# Patient Record
Sex: MALE | Employment: OTHER | URBAN - METROPOLITAN AREA
[De-identification: names, ages, dates, MRNs, and addresses within clinical notes are randomized per-mention and may not be internally consistent; named-entity substitution may affect disease eponyms.]

---

## 2021-03-15 ENCOUNTER — APPOINTMENT (OUTPATIENT)
Dept: CARDIOLOGY | Facility: CLINIC | Age: 66
End: 2021-03-15
Attending: STUDENT IN AN ORGANIZED HEALTH CARE EDUCATION/TRAINING PROGRAM
Payer: COMMERCIAL

## 2021-03-15 ENCOUNTER — COMMUNICATION - HEALTHEAST (OUTPATIENT)
Dept: SCHEDULING | Facility: CLINIC | Age: 66
End: 2021-03-15

## 2021-03-15 ENCOUNTER — HOSPITAL ENCOUNTER (OUTPATIENT)
Facility: CLINIC | Age: 66
Setting detail: OBSERVATION
Discharge: HOME | End: 2021-03-15
Attending: INTERNAL MEDICINE | Admitting: STUDENT IN AN ORGANIZED HEALTH CARE EDUCATION/TRAINING PROGRAM
Payer: COMMERCIAL

## 2021-03-15 ENCOUNTER — TRANSFERRED RECORDS (OUTPATIENT)
Dept: HEALTH INFORMATION MANAGEMENT | Facility: CLINIC | Age: 66
End: 2021-03-15

## 2021-03-15 VITALS
SYSTOLIC BLOOD PRESSURE: 147 MMHG | TEMPERATURE: 98.3 F | DIASTOLIC BLOOD PRESSURE: 84 MMHG | WEIGHT: 236.4 LBS | HEART RATE: 104 BPM | HEIGHT: 75 IN | BODY MASS INDEX: 29.39 KG/M2 | RESPIRATION RATE: 18 BRPM | OXYGEN SATURATION: 96 %

## 2021-03-15 DIAGNOSIS — R05.9 COUGH: Primary | ICD-10-CM

## 2021-03-15 DIAGNOSIS — J02.0 STREPTOCOCCAL PHARYNGITIS: ICD-10-CM

## 2021-03-15 LAB
DEPRECATED S PYO AG THROAT QL EIA: POSITIVE
LACTATE BLD-SCNC: 0.7 MMOL/L (ref 0.7–2)
SPECIMEN SOURCE: ABNORMAL
TROPONIN I SERPL-MCNC: <0.015 UG/L (ref 0–0.04)
TROPONIN I SERPL-MCNC: <0.015 UG/L (ref 0–0.04)

## 2021-03-15 PROCEDURE — 93350 STRESS TTE ONLY: CPT | Mod: 26 | Performed by: INTERNAL MEDICINE

## 2021-03-15 PROCEDURE — 93016 CV STRESS TEST SUPVJ ONLY: CPT | Performed by: INTERNAL MEDICINE

## 2021-03-15 PROCEDURE — G0378 HOSPITAL OBSERVATION PER HR: HCPCS

## 2021-03-15 PROCEDURE — 87880 STREP A ASSAY W/OPTIC: CPT | Performed by: INTERNAL MEDICINE

## 2021-03-15 PROCEDURE — 99220 PR INITIAL OBSERVATION CARE,LEVEL III: CPT | Performed by: STUDENT IN AN ORGANIZED HEALTH CARE EDUCATION/TRAINING PROGRAM

## 2021-03-15 PROCEDURE — 84484 ASSAY OF TROPONIN QUANT: CPT | Performed by: STUDENT IN AN ORGANIZED HEALTH CARE EDUCATION/TRAINING PROGRAM

## 2021-03-15 PROCEDURE — 255N000002 HC RX 255 OP 636: Performed by: INTERNAL MEDICINE

## 2021-03-15 PROCEDURE — 83605 ASSAY OF LACTIC ACID: CPT | Performed by: STUDENT IN AN ORGANIZED HEALTH CARE EDUCATION/TRAINING PROGRAM

## 2021-03-15 PROCEDURE — 93321 DOPPLER ECHO F-UP/LMTD STD: CPT | Mod: 26 | Performed by: INTERNAL MEDICINE

## 2021-03-15 PROCEDURE — 99217 PR OBSERVATION CARE DISCHARGE: CPT | Performed by: INTERNAL MEDICINE

## 2021-03-15 PROCEDURE — 36415 COLL VENOUS BLD VENIPUNCTURE: CPT | Performed by: STUDENT IN AN ORGANIZED HEALTH CARE EDUCATION/TRAINING PROGRAM

## 2021-03-15 PROCEDURE — 93005 ELECTROCARDIOGRAM TRACING: CPT | Mod: XU

## 2021-03-15 PROCEDURE — 93325 DOPPLER ECHO COLOR FLOW MAPG: CPT | Mod: TC

## 2021-03-15 PROCEDURE — 93010 ELECTROCARDIOGRAM REPORT: CPT | Performed by: INTERNAL MEDICINE

## 2021-03-15 PROCEDURE — 250N000013 HC RX MED GY IP 250 OP 250 PS 637: Performed by: STUDENT IN AN ORGANIZED HEALTH CARE EDUCATION/TRAINING PROGRAM

## 2021-03-15 PROCEDURE — 93018 CV STRESS TEST I&R ONLY: CPT | Performed by: INTERNAL MEDICINE

## 2021-03-15 PROCEDURE — 93325 DOPPLER ECHO COLOR FLOW MAPG: CPT | Mod: 26 | Performed by: INTERNAL MEDICINE

## 2021-03-15 RX ORDER — ACETAMINOPHEN 325 MG/1
650 TABLET ORAL EVERY 4 HOURS PRN
Status: DISCONTINUED | OUTPATIENT
Start: 2021-03-15 | End: 2021-03-15 | Stop reason: HOSPADM

## 2021-03-15 RX ORDER — ACETAMINOPHEN 650 MG/1
650 SUPPOSITORY RECTAL EVERY 4 HOURS PRN
Status: DISCONTINUED | OUTPATIENT
Start: 2021-03-15 | End: 2021-03-15 | Stop reason: HOSPADM

## 2021-03-15 RX ORDER — ASPIRIN 81 MG/1
81 TABLET ORAL DAILY
Status: DISCONTINUED | OUTPATIENT
Start: 2021-03-16 | End: 2021-03-15 | Stop reason: HOSPADM

## 2021-03-15 RX ORDER — GUAIFENESIN/DEXTROMETHORPHAN 100-10MG/5
10 SYRUP ORAL EVERY 4 HOURS PRN
Status: DISCONTINUED | OUTPATIENT
Start: 2021-03-15 | End: 2021-03-15 | Stop reason: HOSPADM

## 2021-03-15 RX ORDER — NITROGLYCERIN 0.4 MG/1
0.4 TABLET SUBLINGUAL EVERY 5 MIN PRN
Status: DISCONTINUED | OUTPATIENT
Start: 2021-03-15 | End: 2021-03-15 | Stop reason: HOSPADM

## 2021-03-15 RX ORDER — ASPIRIN 81 MG/1
162 TABLET, CHEWABLE ORAL ONCE
Status: COMPLETED | OUTPATIENT
Start: 2021-03-15 | End: 2021-03-15

## 2021-03-15 RX ORDER — LIDOCAINE 40 MG/G
CREAM TOPICAL
Status: DISCONTINUED | OUTPATIENT
Start: 2021-03-15 | End: 2021-03-15 | Stop reason: HOSPADM

## 2021-03-15 RX ORDER — GUAIFENESIN/DEXTROMETHORPHAN 100-10MG/5
10 SYRUP ORAL EVERY 4 HOURS PRN
Qty: 118 ML | Refills: 0 | Status: SHIPPED | OUTPATIENT
Start: 2021-03-15

## 2021-03-15 RX ORDER — AMLODIPINE AND BENAZEPRIL HYDROCHLORIDE 5; 20 MG/1; MG/1
1 CAPSULE ORAL DAILY
COMMUNITY

## 2021-03-15 RX ORDER — AMOXICILLIN 500 MG/1
500 CAPSULE ORAL 3 TIMES DAILY
Qty: 20 CAPSULE | Refills: 0 | Status: SHIPPED | OUTPATIENT
Start: 2021-03-15

## 2021-03-15 RX ORDER — MAGNESIUM HYDROXIDE/ALUMINUM HYDROXICE/SIMETHICONE 120; 1200; 1200 MG/30ML; MG/30ML; MG/30ML
30 SUSPENSION ORAL EVERY 4 HOURS PRN
Status: DISCONTINUED | OUTPATIENT
Start: 2021-03-15 | End: 2021-03-15 | Stop reason: HOSPADM

## 2021-03-15 RX ADMIN — ASPIRIN 81 MG CHEWABLE TABLET 162 MG: 81 TABLET CHEWABLE at 08:44

## 2021-03-15 RX ADMIN — HUMAN ALBUMIN MICROSPHERES AND PERFLUTREN 8 ML: 10; .22 INJECTION, SOLUTION INTRAVENOUS at 10:16

## 2021-03-15 SDOH — HEALTH STABILITY: MENTAL HEALTH: HOW OFTEN DO YOU HAVE A DRINK CONTAINING ALCOHOL?: 2-4 TIMES A MONTH

## 2021-03-15 SDOH — HEALTH STABILITY: MENTAL HEALTH: HOW MANY STANDARD DRINKS CONTAINING ALCOHOL DO YOU HAVE ON A TYPICAL DAY?: NOT ASKED

## 2021-03-15 SDOH — HEALTH STABILITY: MENTAL HEALTH: HOW OFTEN DO YOU HAVE 6 OR MORE DRINKS ON ONE OCCASION?: NOT ASKED

## 2021-03-15 ASSESSMENT — COLUMBIA-SUICIDE SEVERITY RATING SCALE - C-SSRS
2. HAVE YOU ACTUALLY HAD ANY THOUGHTS OF KILLING YOURSELF IN THE PAST MONTH?: NO
3. HAVE YOU BEEN THINKING ABOUT HOW YOU MIGHT KILL YOURSELF?: NO
1. IN THE PAST MONTH, HAVE YOU WISHED YOU WERE DEAD OR WISHED YOU COULD GO TO SLEEP AND NOT WAKE UP?: NO
4. HAVE YOU HAD THESE THOUGHTS AND HAD SOME INTENTION OF ACTING ON THEM?: NO
5. HAVE YOU STARTED TO WORK OUT OR WORKED OUT THE DETAILS OF HOW TO KILL YOURSELF? DO YOU INTEND TO CARRY OUT THIS PLAN?: NO
6. HAVE YOU EVER DONE ANYTHING, STARTED TO DO ANYTHING, OR PREPARED TO DO ANYTHING TO END YOUR LIFE?: NO

## 2021-03-15 ASSESSMENT — MIFFLIN-ST. JEOR: SCORE: 1942.93

## 2021-03-15 NOTE — PLAN OF CARE
Pt ok'd for discharge after negative stress test.. Did test positive for strept throat. Rx called to his pharmacy. Sent home with Robitussin. Stable at discharge

## 2021-03-15 NOTE — DISCHARGE SUMMARY
[facility]  Discharge Summary        Scott Martinez MRN# [id number]   YOB: 1955 Age: 65 year old     Date of Admission:  3/15/2021  Date of Discharge:  3/15/2021  Admitting Physician:  Saima Hughes MD  Discharge Physician: Katelynn Montana MD  Discharging Service: Hospitalist     Primary Provider: No Ref-Primary, Physician  Primary Care Physician Phone Number: None         Discharge Diagnoses/Problem Oriented Hospital Course (Providers):    Scott Martinez was admitted on 3/15/2021 by Saima Hughes MD and I would refer you to their history and physical.  The following problems were addressed during his hospitalization:  Assessment & Plan     Scott Martinez is a 65 year old male with past medical history significant for hypertension admitted on 3/15/2021 with chest pain and shortness of breath.      Chest pain  Shortness of breath most likely from strep pharyngitis with    Pt presented to the ED with sudden onset shortness of breath and chest pain that woke him from sleep. Work-up thus far has been essentially unrevealing. He had negative troponin x2. They did note initial ST depressions on EKG, but this resolved on repeat CTA of the chest abdomen and pelvis was unremarkable. NT pro BNP was negative. He does not have any personal history of cardiac disease, but does have some underlying risk factors including HTN, HLD, and obesity. Admitted for full chest pain rule out.   serail trops negative   Stress echo was done and returned normal       Strep pharyngitis   Overnight patient developed sore throat and was having dry cough   Covid  19 and Influenza A and B were negative   Strep test sent and returned positive   Supportive treatment with Robitussin Dm and tylenol   Fluid intake encouraged   Started on ampicillin for strep infection        Elevated lactate   Elevated to 2.9 initially. He was given fluids and a dose of ceftriaxone in the ED. I do not see any clear localizing  source of infection. Will hold of on any additional antibiotics at this time.     Lactate normalized this morning      HTN/HLD  PTA medications include amlodipine-benazepril - resume when verified.   Not on statin or asa prior to admission.      Obesity   Body mass index is 29.55 kg/m .              Diet: Combination Diet Regular Diet Adult; No Caffeine Diet    DVT Prophylaxis: Low Risk/Ambulatory with no VTE prophylaxis indicated  Copeland Catheter: not present  Code Status: Full Code               Disposition Plan     Expected discharge: home today     Katelynn Montaan MD   Page [phone](7AM-6PM)          Code Status:      Full Code        Brief Hospital Stay Summary Sent Home With Patient in AVS:        Reason for your hospital stay      Chest pain with negative stress test                 Important Results:      See below         Pending Results:        Unresulted Labs Ordered in the Past 30 Days of this Admission     No orders found from 2/13/2021 to 3/16/2021.            Discharge Instructions and Follow-Up:      Follow-up Appointments     Follow-up and recommended labs and tests       Follow up with primary care provider, Physician No Ref-Primary, within 7   days for hospital follow- up.  No follow up labs or test are needed.               Discharge Disposition:      Discharged to home        Discharge Medications:        Current Discharge Medication List      START taking these medications    Details   guaiFENesin-dextromethorphan (ROBITUSSIN DM) 100-10 MG/5ML syrup Take 10 mLs by mouth every 4 hours as needed for cough  Qty: 118 mL, Refills: 0    Associated Diagnoses: Cough      Amoxicillin 500mg PO TID for 10days    CONTINUE these medications which have NOT CHANGED    Details   amLODIPine-benazepril (LOTREL) 5-20 MG capsule Take 1 capsule by mouth daily               Allergies:       No Known Allergies        Consultations This Hospital Stay:      No consultations were requested during this admission         "Condition and Physical on Discharge:      Discharge condition: Stable   Vitals: Blood pressure (!) 147/84, pulse 104, temperature 98.3  F (36.8  C), temperature source Oral, resp. rate 18, height 1.905 m (6' 3\"), weight 107.2 kg (236 lb 6.4 oz), SpO2 96 %.     Constitutional: Alert, awake, NAD    Lungs: CTA b/l    Cardiovascular: RRR, no murmur    Abdomen: Soft, NT, ND, BS+   Skin: Warm and dry    Other:          Discharge Time:      Greater than 30 minutes.        Image Results From This Hospital Stay (For Non-EPIC Providers):         Results for orders placed or performed during the hospital encounter of 03/15/21   Echo Stress Echocardiogram    Narrative    [id number]  [id number]  [id number]  [id number]^SARA^WINSTON^BASSAM           [facility]  Echocardiography Laboratory  [address] [place] [address]        Name: HENNY SCALES  MRN: [id number]  : 1955  Study Date: 03/15/2021 10:08 AM  Age: 65 yrs  Gender: Male  Patient Location: [facility]  Reason For Study: Chest Pain  Ordering Physician: WINSTON RUIZ  Referring Physician: BALJIT PCP  Performed By: Bhavana Naylor     BSA: 2.4 m2  Height: 75 in  Weight: 236 lb  HR: 100  BP: 133/92 mmHg  _____________________________________________________________________________  __        Procedure  Stress Echo Complete. Contrast Optison.  _____________________________________________________________________________  __        Interpretation Summary     1. Echocardiogram stress test was negative for ischemia at a diagnostic level  of peak stress with customized Jermain protocol (91% MPHR).  2. EKG at rest and with stress shows no diagnostic ST-T abnormalities  suggestive of ischemia. Non specific ST-T changes at rest and with stress.  3. Normal BP response to exercise.  4. No chest pain or dyspnea with exercise. Test terminated due to fatigue.  5. Below average functional capacity for age.  6. Baseline screening echocardioram demonstrates no significant " valve  dysfunction. Normal appearing aortic root.  _____________________________________________________________________________  __     Stress  The patient exercised 5:00 min.  Exercise was stopped due to fatigue.  There was a normal BP response to exercise.  The patient exhibited no chest pain during exercise.  A low to moderate workload was achieved.  A treadmill exercise test according to a customized protocol was performed.  Target Heart Rate was achieved.  The EKG portion of this stress test was negative for inducible ischemia (see  echo results below).  Normal left ventricular function and wall motion at rest and post-stress.  The visual ejection fraction is estimated at 65-70%.  Left ventricular cavity size decreases with exercise.  Global LV systolic function augments with exercise.     Baseline  The patient is in normal sinus rhythm.  Non specific st-t changes.  Normal left ventricular function and wall motion at rest.  The visual ejection fraction is estimated at 50-55%.     Stress Results         Protocol:  Custom Protocol        Maximum Predicted HR:   155 bpm         Target HR: 132 bpm                % Maximum Predicted HR: 91 %              Duration  Heart    Stage   (mm:ss)   Rate     BP                    Comment                     (bpm)   Stage 1   3:00     139    158/841.7 mph, 10% incline   Stage 2   2:00     141    172/821.7 mph, 10% incline             5:00      97    148/84RPP: 24,252; FAC: Below Average; Gaspar Score:  RECOVERYR                        3                Stress Duration:   5:00 mm:ss        Recovery Time: 5:00 mm:ss          Maximum Stress HR: 141 bpm           METS:          4     Left Ventricle  The left ventricle is normal in structure, function and size.     Right Ventricle  The right ventricle is normal in structure, function and size.     Mitral Valve  The mitral valve is normal in structure and function.        Tricuspid Valve  The tricuspid valve is normal in structure  and function. There is mild (1+)  tricuspid regurgitation.     Aortic Valve  Normal tricuspid aortic valve.     Pulmonic Valve  The pulmonic valve is normal in structure and function.     Vessels  The aortic root is normal size.     Pericardium  There is no pericardial effusion.     _____________________________________________________________________________  __                             Report approved by: Carlos Cotto 03/15/2021 11:24 AM                       _____________________________________________________________________________  __                Most Recent Lab Results In EPIC (For Non-EPIC Providers):    Most Recent 3 CBC's:No lab results found.   Most Recent 3 BMP's:No lab results found.  Most Recent 3 Troponin's:  Recent Labs   Lab Test 03/15/21  0842 03/15/21  0501   TROPI <0.015 <0.015     Most Recent 3 INR's:No lab results found.  Most Recent 2 LFT's:No lab results found.  Most Recent Cholesterol Panel:No lab results found.  Most Recent 6 Bacteria Isolates From Any Culture (See EPIC Reports for Culture Details):No lab results found.  Most Recent TSH, T4 and HgbA1c: No lab results found.

## 2021-03-15 NOTE — H&P
[facility]    History and Physical - Hospitalist Service       Date of Admission:  3/15/2021    Assessment & Plan   Scott Martinez is a 65 year old male with past medical history significant for hypertension admitted on 3/15/2021 with chest pain and shortness of breath.     Chest pain  Shortness of breath   Pt presented to the ED with sudden onset shortness of breath and chest pain that woke him from sleep. Work-up thus far has been essentially unrevealing. He had negative troponin x2. They did note initial ST depressions on EKG, but this resolved on repeat CTA of the chest abdomen and pelvis was unremarkable. NT pro BNP was negative. He does not have any personal history of cardiac disease, but does have some underlying risk factors including HTN, HLD, and obesity. Admitted for full chest pain rule out.   - Repeat EKG now  - Trend troponin   - Telemetry   - Exercise stress test ordered   - Check lipids, A1C     Elevated lactate   Elevated to 2.9 initially. He was given fluids and a dose of ceftriaxone in the ED. I do not see any clear localizing source of infection. Will hold of on any additional antibiotics at this time.   - Trend lactate     HTN/HLD  PTA medications include amlodipine-benazepril - resume when verified.   Not on statin or asa prior to admission.     Obesity   Body mass index is 29.55 kg/m .         Diet: Combination Diet Regular Diet Adult; No Caffeine Diet    DVT Prophylaxis: Low Risk/Ambulatory with no VTE prophylaxis indicated  Copeland Catheter: not present  Code Status: Full Code           Disposition Plan   Expected discharge: Tomorrow, recommended to prior living arrangement once adequate pain management/ tolerating PO medications.  Entered: Saima Hughes MD 03/15/2021, 5:41 AM     The patient's care was discussed with the Patient.    Saima Hughes MD  [facility]  Contact information available via [facility]  Paging/Directory      ______________________________________________________________________    Chief Complaint   Chest pain shortness of breath    History is obtained from the patient    History of Present Illness   Scott Martinez is a 65 year old male who presented to an outside emergency department with sudden onset chest pain and shortness of breath that woke him from sleep.  He reports waking up from sleep feeling like he was choking and could not breathe.  Some noted some central chest tightness during this time.  He has never had any similar episodes in the past.  He does note that he has high blood pressure but has never had any other problems with his heart or lungs.  He denies any recent increased dyspnea on exertion, orthopnea, lower extremity swelling, exertional chest pain.  Generally he has been feeling quite well recently, though did note episode last weekend of stomach upset that has since resolved.     In the emergency department he was hypertensive with a blood pressure of 162/87, slightly tachycardic to 102 he had normal oxygen saturations.  Basic metabolic panel and CBC were unremarkable.  He did have an elevated lactate to 2.9.  BNP was not elevated troponin was undetectable.  Covid testing was negative.    He had a CTA of the chest abdomen pelvis which was entirely unremarkable.    Review of Systems    The 10 point Review of Systems is negative other than noted in the HPI or here.     Past Medical History    I have reviewed this patient's medical history and updated it with pertinent information if needed.   Past Medical History:   Diagnosis Date     Essential hypertension        Past Surgical History   I have reviewed this patient's surgical history and updated it with pertinent information if needed.  No past surgical history on file.    Social History   I have reviewed this patient's social history and updated it with pertinent information if needed.  Social History     Tobacco Use      Smoking status: Never Smoker   Substance Use Topics     Alcohol use: Yes     Frequency: 2-4 times a month     Drug use: Not on file       Family History   -Patient denies any known family history of cardiac disease    Prior to Admission Medications   None     Allergies   Not on File    Physical Exam   Vital Signs: Temp: 97.9  F (36.6  C) Temp src: Oral BP: (!) 148/84 Pulse: 106   Resp: 18 SpO2: 96 % O2 Device: None (Room air)    Weight: 236 lbs 6.4 oz  Constitutional: Awake, alert, cooperative, no apparent distress.  Eyes: Conjunctiva and pupils examined and normal.  HEENT: Moist mucous membranes, normal dentition, normal posterior oropharynx.  Respiratory: Clear to auscultation bilaterally, no crackles or wheezing.  Cardiovascular: Regular rate and rhythm, normal S1 and S2, and no murmur noted.  GI: Soft, non-distended, non-tender, normal bowel sounds.  Lymph/Hematologic: No anterior cervical or supraclavicular adenopathy.  Skin: No rashes, no cyanosis, no edema.  Musculoskeletal: No joint swelling, erythema or tenderness.  Neurologic: Cranial nerves 2-12 intact, normal strength and sensation.  Psychiatric: Alert, oriented to person, place and time, no obvious anxiety or depression.      Data   Data reviewed today: I reviewed all medications, new labs and imaging results over the last 24 hours.     Labs and imaging from outside hospital reviewed.

## 2021-03-15 NOTE — PLAN OF CARE
Neuro- WDL  Most Recent Vitals- Temp: 97.9  F (36.6  C) Temp src: Oral BP: (!) 148/84 Pulse: 106   Resp: 18 SpO2: 96 % O2 Device: None (Room air)   Tele/Cardiac- ST low 100's.  Increased to 120's with activity  Resp- RA. Shortness of breath with activity  Activity- Independent  Pain- Does get chest pain immediately after coughing and pain subsides. Sore throat note.  Drips- NA  Drains/Tubes- SL  Skin- Intact  GI/- WDL  Aggression Color- Green  COVID status- Negative  Plan- Observation status. Needs EKG done.      AMINTA AARON, RN

## 2021-03-15 NOTE — PHARMACY-ADMISSION MEDICATION HISTORY
Pharmacy Medication History  Admission medication history interview status for the 3/15/2021  admission is complete. See EPIC admission navigator for prior to admission medications     Location of Interview: Phone  Medication history sources: Spoke w/ patient.  Reviewed information in Care Everywhere.      Significant changes made to the medication list:  -  Added Amlodipine/Benazepril.     In the past week, patient estimated taking medication this percent of the time: greater than 90%    Medication reconciliation completed by provider prior to medication history? No    Time spent in this activity: 10 minutes    Prior to Admission medications    Medication Sig Last Dose Taking? Auth Provider   amLODIPine-benazepril (LOTREL) 5-20 MG capsule Take 1 capsule by mouth daily  Yes Unknown, Entered By History       The information provided in this note is only as accurate as the sources available at the time of update(s)     Jocelin Story, AnnetteD, BCPS

## 2021-03-16 LAB — INTERPRETATION ECG - MUSE: NORMAL

## 2021-06-16 PROBLEM — I10 HTN (HYPERTENSION): Status: ACTIVE | Noted: 2021-03-15
